# Patient Record
Sex: FEMALE | Race: OTHER | URBAN - METROPOLITAN AREA
[De-identification: names, ages, dates, MRNs, and addresses within clinical notes are randomized per-mention and may not be internally consistent; named-entity substitution may affect disease eponyms.]

---

## 2024-09-18 ENCOUNTER — OFFICE VISIT (OUTPATIENT)
Dept: OBGYN CLINIC | Facility: CLINIC | Age: 44
End: 2024-09-18
Payer: COMMERCIAL

## 2024-09-18 VITALS
BODY MASS INDEX: 30.69 KG/M2 | WEIGHT: 184.19 LBS | SYSTOLIC BLOOD PRESSURE: 140 MMHG | DIASTOLIC BLOOD PRESSURE: 88 MMHG | HEIGHT: 65 IN | HEART RATE: 74 BPM

## 2024-09-18 DIAGNOSIS — N95.1 VASOMOTOR SYMPTOMS DUE TO MENOPAUSE: Primary | ICD-10-CM

## 2024-09-18 PROCEDURE — 99203 OFFICE O/P NEW LOW 30 MIN: CPT | Performed by: NURSE PRACTITIONER

## 2024-09-18 RX ORDER — CELECOXIB 100 MG/1
100 CAPSULE ORAL DAILY
COMMUNITY
Start: 2024-08-22

## 2024-09-18 RX ORDER — ESTRADIOL 0.04 MG/D
1 PATCH, EXTENDED RELEASE TRANSDERMAL
Qty: 24 PATCH | Refills: 0 | Status: SHIPPED | OUTPATIENT
Start: 2024-09-19 | End: 2024-12-18

## 2024-09-18 NOTE — PROGRESS NOTES
Subjective:  43 year old    Chief Complaint   Patient presents with    Menopause     Patient went to get labs and was told she is in menopause. Patient has the mirena IUD     Pt here today to discuss menopause  Notes that in  she was experiencing weight loss and tachycardia and was diagnosed with hyperthyroidism  She has been treated and taken off the medication but is now experiencing hair loss, brain fog, weight gain, joint pain and hot flashes  Her endocrinologist completed a lab work up and found the pt to be menopausal (FSH 84.6, estradiol <12)  Pt notes that she has Mirena IUD placed 2019- has been amenorrheic with IUD  Mother also went through menopause in early 40s  Denies personal/family history of breast CA  Denies personal/family history of VTE    Review of Systems:  Pertinent items are noted in the HPI.    Objective:  /88   Pulse 74   Ht 65\"   Wt 184 lb 3.2 oz (83.6 kg)       Physical Examination:  General appearance: Well dressed, well nourished in no apparent distress  Neurologic/Psychiatric: Alert and oriented to person, place and time, mood normal, affect appropriate  Abdomen: Soft, non-tender, non-distended, no masses, no hepatosplenomegaly, no hernias, no inguinal lymphadenopathy  Pelvic:    External genitalia- Normal, Bartholin's, urethra, skeins glands normal   Vagina- + IUD strings visualized, No vaginal lesions, physiologic discharge   Cervix- No lesions, long/closed, no cervical motion tenderness   Uterus- Normal, non-tender, no masses   Adnexa-  Non-tender, no masses    Assessment/Plan:      Diagnoses and all orders for this visit:    Vasomotor symptoms due to menopause  - we discussed diet and lifestyle changes and also OTC estroven  - we also discussed treatment of vasomotor symptoms with HRT - ultimately pt desires HRT  - r/b/a discussed, specifically increased risk of breast CA and VTE - pt verbalized understanding    -   Estradiol 0.0375 MG/24HR Transdermal Patch  Biweekly; Place 1 patch onto the skin twice a week.  - we also discussed the importance of endometrial protection for which we will keep Mirena IUD in place, expires 11/2027  - pt to follow up in 3 months for medication management        Return in about 3 months (around 12/18/2024) for medication management.    Total face to face time was 35, more than 50% of the time was spent in review of previous lab results and counseling and/or coordination of care related to vasomotor symptoms of menopause.

## 2024-12-11 DIAGNOSIS — N95.1 VASOMOTOR SYMPTOMS DUE TO MENOPAUSE: ICD-10-CM

## 2024-12-12 RX ORDER — ESTRADIOL 0.04 MG/D
1 PATCH, EXTENDED RELEASE TRANSDERMAL
Qty: 24 PATCH | Refills: 0 | OUTPATIENT
Start: 2024-12-12

## 2024-12-12 NOTE — TELEPHONE ENCOUNTER
Last office visit: 9/18/2024  Follow-up recommended: 3 months for medication management  Last refill date:  Medication Quantity Refills Start End   Estradiol 0.0375 MG/24HR Transdermal Patch Biweekly 24 patch 0 9/19/2024 12/18/2024   Sig:   Place 1 patch onto the skin twice a week.     Next appt.: 12/19/2024     Refill denied per protocol.   Refill will be addressed at office visit on 12/19/2024.